# Patient Record
Sex: MALE | Race: WHITE | NOT HISPANIC OR LATINO | Employment: OTHER | URBAN - METROPOLITAN AREA
[De-identification: names, ages, dates, MRNs, and addresses within clinical notes are randomized per-mention and may not be internally consistent; named-entity substitution may affect disease eponyms.]

---

## 2017-12-12 ENCOUNTER — HOSPITAL ENCOUNTER (OUTPATIENT)
Facility: MEDICAL CENTER | Age: 72
End: 2017-12-12
Attending: NEUROLOGICAL SURGERY | Admitting: NEUROLOGICAL SURGERY

## 2018-03-27 ENCOUNTER — APPOINTMENT (OUTPATIENT)
Dept: ADMISSIONS | Facility: MEDICAL CENTER | Age: 73
DRG: 455 | End: 2018-03-27
Attending: NEUROLOGICAL SURGERY
Payer: COMMERCIAL

## 2018-03-27 RX ORDER — TIZANIDINE 2 MG/1
2 TABLET ORAL EVERY 8 HOURS PRN
COMMUNITY

## 2018-03-27 RX ORDER — ACETAMINOPHEN 500 MG
1000 TABLET ORAL EVERY 6 HOURS PRN
Status: ON HOLD | COMMUNITY
End: 2018-03-30

## 2018-03-27 RX ORDER — LATANOPROST 50 UG/ML
1 SOLUTION/ DROPS OPHTHALMIC NIGHTLY
COMMUNITY

## 2018-03-27 RX ORDER — DIPHENHYDRAMINE HCL 25 MG
25 TABLET ORAL EVERY 6 HOURS PRN
COMMUNITY

## 2018-03-27 RX ORDER — TAMSULOSIN HYDROCHLORIDE 0.4 MG/1
0.4 CAPSULE ORAL EVERY EVENING
COMMUNITY

## 2018-03-27 RX ORDER — AMOXICILLIN 500 MG/1
2000 CAPSULE ORAL PRN
Status: ON HOLD | COMMUNITY
End: 2018-03-28

## 2018-03-28 ENCOUNTER — HOSPITAL ENCOUNTER (INPATIENT)
Facility: MEDICAL CENTER | Age: 73
LOS: 2 days | DRG: 455 | End: 2018-03-30
Attending: NEUROLOGICAL SURGERY | Admitting: NEUROLOGICAL SURGERY
Payer: COMMERCIAL

## 2018-03-28 ENCOUNTER — APPOINTMENT (OUTPATIENT)
Dept: RADIOLOGY | Facility: MEDICAL CENTER | Age: 73
DRG: 455 | End: 2018-03-28
Attending: NEUROLOGICAL SURGERY
Payer: COMMERCIAL

## 2018-03-28 DIAGNOSIS — M54.50 ACUTE MIDLINE LOW BACK PAIN WITHOUT SCIATICA: ICD-10-CM

## 2018-03-28 DIAGNOSIS — G89.18 POSTOPERATIVE PAIN: ICD-10-CM

## 2018-03-28 PROCEDURE — 700111 HCHG RX REV CODE 636 W/ 250 OVERRIDE (IP): Performed by: NURSE PRACTITIONER

## 2018-03-28 PROCEDURE — 0SG00AJ FUSION OF LUMBAR VERTEBRAL JOINT WITH INTERBODY FUSION DEVICE, POSTERIOR APPROACH, ANTERIOR COLUMN, OPEN APPROACH: ICD-10-PCS | Performed by: NEUROLOGICAL SURGERY

## 2018-03-28 PROCEDURE — C1821 INTERSPINOUS IMPLANT: HCPCS | Performed by: NEUROLOGICAL SURGERY

## 2018-03-28 PROCEDURE — 95937 NEUROMUSCULAR JUNCTION TEST: CPT | Performed by: NEUROLOGICAL SURGERY

## 2018-03-28 PROCEDURE — A9270 NON-COVERED ITEM OR SERVICE: HCPCS | Performed by: NURSE PRACTITIONER

## 2018-03-28 PROCEDURE — 700101 HCHG RX REV CODE 250

## 2018-03-28 PROCEDURE — 500367 HCHG DRAIN KIT, HEMOVAC: Performed by: NEUROLOGICAL SURGERY

## 2018-03-28 PROCEDURE — 0SG0071 FUSION OF LUMBAR VERTEBRAL JOINT WITH AUTOLOGOUS TISSUE SUBSTITUTE, POSTERIOR APPROACH, POSTERIOR COLUMN, OPEN APPROACH: ICD-10-PCS | Performed by: NEUROLOGICAL SURGERY

## 2018-03-28 PROCEDURE — 110454 HCHG SHELL REV 250: Performed by: NEUROLOGICAL SURGERY

## 2018-03-28 PROCEDURE — C1713 ANCHOR/SCREW BN/BN,TIS/BN: HCPCS | Performed by: NEUROLOGICAL SURGERY

## 2018-03-28 PROCEDURE — 160002 HCHG RECOVERY MINUTES (STAT): Performed by: NEUROLOGICAL SURGERY

## 2018-03-28 PROCEDURE — 95861 NEEDLE EMG 2 EXTREMITIES: CPT | Performed by: NEUROLOGICAL SURGERY

## 2018-03-28 PROCEDURE — 160009 HCHG ANES TIME/MIN: Performed by: NEUROLOGICAL SURGERY

## 2018-03-28 PROCEDURE — 160031 HCHG SURGERY MINUTES - 1ST 30 MINS LEVEL 5: Performed by: NEUROLOGICAL SURGERY

## 2018-03-28 PROCEDURE — 01NB0ZZ RELEASE LUMBAR NERVE, OPEN APPROACH: ICD-10-PCS | Performed by: NEUROLOGICAL SURGERY

## 2018-03-28 PROCEDURE — 503195 HCHG SEALER, BIPOLAR AQUAMANTYS: Performed by: NEUROLOGICAL SURGERY

## 2018-03-28 PROCEDURE — 110371 HCHG SHELL REV 272: Performed by: NEUROLOGICAL SURGERY

## 2018-03-28 PROCEDURE — 700111 HCHG RX REV CODE 636 W/ 250 OVERRIDE (IP)

## 2018-03-28 PROCEDURE — 700101 HCHG RX REV CODE 250: Performed by: NURSE PRACTITIONER

## 2018-03-28 PROCEDURE — 501838 HCHG SUTURE GENERAL: Performed by: NEUROLOGICAL SURGERY

## 2018-03-28 PROCEDURE — 0SB20ZZ EXCISION OF LUMBAR VERTEBRAL DISC, OPEN APPROACH: ICD-10-PCS | Performed by: NEUROLOGICAL SURGERY

## 2018-03-28 PROCEDURE — 160048 HCHG OR STATISTICAL LEVEL 1-5: Performed by: NEUROLOGICAL SURGERY

## 2018-03-28 PROCEDURE — 160035 HCHG PACU - 1ST 60 MINS PHASE I: Performed by: NEUROLOGICAL SURGERY

## 2018-03-28 PROCEDURE — 160042 HCHG SURGERY MINUTES - EA ADDL 1 MIN LEVEL 5: Performed by: NEUROLOGICAL SURGERY

## 2018-03-28 PROCEDURE — 700102 HCHG RX REV CODE 250 W/ 637 OVERRIDE(OP): Performed by: NURSE PRACTITIONER

## 2018-03-28 PROCEDURE — 160036 HCHG PACU - EA ADDL 30 MINS PHASE I: Performed by: NEUROLOGICAL SURGERY

## 2018-03-28 PROCEDURE — 95940 IONM IN OPERATNG ROOM 15 MIN: CPT | Performed by: NEUROLOGICAL SURGERY

## 2018-03-28 PROCEDURE — 700112 HCHG RX REV CODE 229: Performed by: NURSE PRACTITIONER

## 2018-03-28 PROCEDURE — 72100 X-RAY EXAM L-S SPINE 2/3 VWS: CPT

## 2018-03-28 PROCEDURE — 500885 HCHG PACK, JACKSON TABLE: Performed by: NEUROLOGICAL SURGERY

## 2018-03-28 PROCEDURE — 502240 HCHG MISC OR SUPPLY RC 0272: Performed by: NEUROLOGICAL SURGERY

## 2018-03-28 PROCEDURE — L0631 LSO SAG R AN/POS PNL PRE CST: HCPCS

## 2018-03-28 PROCEDURE — 770001 HCHG ROOM/CARE - MED/SURG/GYN PRIV*

## 2018-03-28 PROCEDURE — A4314 CATH W/DRAINAGE 2-WAY LATEX: HCPCS | Performed by: NEUROLOGICAL SURGERY

## 2018-03-28 PROCEDURE — 95938 SOMATOSENSORY TESTING: CPT | Performed by: NEUROLOGICAL SURGERY

## 2018-03-28 DEVICE — ROD PREBENT TITANIUM 5.5 X 30MM (2TCONX2=4): Type: IMPLANTABLE DEVICE | Status: FUNCTIONAL

## 2018-03-28 DEVICE — SCREW SOLERA SET SCREW (1TCX40+3TCX21+2TCX10=123): Type: IMPLANTABLE DEVICE | Status: FUNCTIONAL

## 2018-03-28 DEVICE — SPACER RISE 10X26MM 8-15MM 10 DEGREES (3TCONX2=6): Type: IMPLANTABLE DEVICE | Status: FUNCTIONAL

## 2018-03-28 DEVICE — SCREW MAS  SOLERA 6.5 X 45MM (1TCX16+3TCX8=40): Type: IMPLANTABLE DEVICE | Status: FUNCTIONAL

## 2018-03-28 RX ORDER — ONDANSETRON 2 MG/ML
4 INJECTION INTRAMUSCULAR; INTRAVENOUS EVERY 4 HOURS PRN
Status: DISCONTINUED | OUTPATIENT
Start: 2018-03-28 | End: 2018-03-30 | Stop reason: HOSPADM

## 2018-03-28 RX ORDER — BUPIVACAINE HYDROCHLORIDE AND EPINEPHRINE 2.5; 5 MG/ML; UG/ML
INJECTION, SOLUTION EPIDURAL; INFILTRATION; INTRACAUDAL; PERINEURAL
Status: DISCONTINUED | OUTPATIENT
Start: 2018-03-28 | End: 2018-03-28 | Stop reason: HOSPADM

## 2018-03-28 RX ORDER — LIDOCAINE HYDROCHLORIDE 10 MG/ML
0.5 INJECTION, SOLUTION INFILTRATION; PERINEURAL
Status: ACTIVE | OUTPATIENT
Start: 2018-03-28 | End: 2018-03-29

## 2018-03-28 RX ORDER — LATANOPROST 50 UG/ML
1 SOLUTION/ DROPS OPHTHALMIC NIGHTLY
Status: DISCONTINUED | OUTPATIENT
Start: 2018-03-28 | End: 2018-03-30 | Stop reason: HOSPADM

## 2018-03-28 RX ORDER — POLYETHYLENE GLYCOL 3350 17 G/17G
1 POWDER, FOR SOLUTION ORAL 2 TIMES DAILY PRN
Status: DISCONTINUED | OUTPATIENT
Start: 2018-03-28 | End: 2018-03-30 | Stop reason: HOSPADM

## 2018-03-28 RX ORDER — SODIUM CHLORIDE AND POTASSIUM CHLORIDE 150; 900 MG/100ML; MG/100ML
INJECTION, SOLUTION INTRAVENOUS CONTINUOUS
Status: DISCONTINUED | OUTPATIENT
Start: 2018-03-28 | End: 2018-03-30 | Stop reason: HOSPADM

## 2018-03-28 RX ORDER — ENEMA 19; 7 G/133ML; G/133ML
1 ENEMA RECTAL
Status: DISCONTINUED | OUTPATIENT
Start: 2018-03-28 | End: 2018-03-30 | Stop reason: HOSPADM

## 2018-03-28 RX ORDER — SIMVASTATIN 20 MG
20 TABLET ORAL NIGHTLY
Status: DISCONTINUED | OUTPATIENT
Start: 2018-03-28 | End: 2018-03-30 | Stop reason: HOSPADM

## 2018-03-28 RX ORDER — ONDANSETRON 4 MG/1
4 TABLET, ORALLY DISINTEGRATING ORAL EVERY 4 HOURS PRN
Status: DISCONTINUED | OUTPATIENT
Start: 2018-03-28 | End: 2018-03-30 | Stop reason: HOSPADM

## 2018-03-28 RX ORDER — SODIUM CHLORIDE, SODIUM LACTATE, POTASSIUM CHLORIDE, CALCIUM CHLORIDE 600; 310; 30; 20 MG/100ML; MG/100ML; MG/100ML; MG/100ML
INJECTION, SOLUTION INTRAVENOUS CONTINUOUS
Status: DISCONTINUED | OUTPATIENT
Start: 2018-03-28 | End: 2018-03-28

## 2018-03-28 RX ORDER — CEFAZOLIN SODIUM 2 G/100ML
2 INJECTION, SOLUTION INTRAVENOUS EVERY 8 HOURS
Status: COMPLETED | OUTPATIENT
Start: 2018-03-28 | End: 2018-03-29

## 2018-03-28 RX ORDER — DOCUSATE SODIUM 100 MG/1
100 CAPSULE, LIQUID FILLED ORAL 2 TIMES DAILY
Status: DISCONTINUED | OUTPATIENT
Start: 2018-03-28 | End: 2018-03-30 | Stop reason: HOSPADM

## 2018-03-28 RX ORDER — TAMSULOSIN HYDROCHLORIDE 0.4 MG/1
0.4 CAPSULE ORAL EVERY EVENING
Status: DISCONTINUED | OUTPATIENT
Start: 2018-03-28 | End: 2018-03-30 | Stop reason: HOSPADM

## 2018-03-28 RX ORDER — LIDOCAINE HYDROCHLORIDE 10 MG/ML
INJECTION, SOLUTION INFILTRATION; PERINEURAL
Status: COMPLETED
Start: 2018-03-28 | End: 2018-03-28

## 2018-03-28 RX ORDER — BISACODYL 10 MG
10 SUPPOSITORY, RECTAL RECTAL
Status: DISCONTINUED | OUTPATIENT
Start: 2018-03-28 | End: 2018-03-30 | Stop reason: HOSPADM

## 2018-03-28 RX ORDER — AMOXICILLIN 250 MG
1 CAPSULE ORAL
Status: DISCONTINUED | OUTPATIENT
Start: 2018-03-28 | End: 2018-03-30 | Stop reason: HOSPADM

## 2018-03-28 RX ORDER — DIPHENHYDRAMINE HYDROCHLORIDE 50 MG/ML
25 INJECTION INTRAMUSCULAR; INTRAVENOUS EVERY 6 HOURS PRN
Status: DISCONTINUED | OUTPATIENT
Start: 2018-03-28 | End: 2018-03-30 | Stop reason: HOSPADM

## 2018-03-28 RX ORDER — TIZANIDINE 4 MG/1
2 TABLET ORAL 3 TIMES DAILY PRN
Status: DISCONTINUED | OUTPATIENT
Start: 2018-03-28 | End: 2018-03-30 | Stop reason: HOSPADM

## 2018-03-28 RX ORDER — DIPHENHYDRAMINE HCL 25 MG
25 TABLET ORAL EVERY 6 HOURS PRN
Status: DISCONTINUED | OUTPATIENT
Start: 2018-03-28 | End: 2018-03-30 | Stop reason: HOSPADM

## 2018-03-28 RX ORDER — LIDOCAINE AND PRILOCAINE 25; 25 MG/G; MG/G
1 CREAM TOPICAL
Status: ACTIVE | OUTPATIENT
Start: 2018-03-28 | End: 2018-03-29

## 2018-03-28 RX ADMIN — HYDROMORPHONE HYDROCHLORIDE 0.5 MG: 10 INJECTION, SOLUTION INTRAMUSCULAR; INTRAVENOUS; SUBCUTANEOUS at 16:17

## 2018-03-28 RX ADMIN — CEFAZOLIN SODIUM 2 G: 2 INJECTION, SOLUTION INTRAVENOUS at 17:54

## 2018-03-28 RX ADMIN — TAMSULOSIN HYDROCHLORIDE 0.4 MG: 0.4 CAPSULE ORAL at 22:44

## 2018-03-28 RX ADMIN — SIMVASTATIN 20 MG: 20 TABLET, FILM COATED ORAL at 22:45

## 2018-03-28 RX ADMIN — DOCUSATE SODIUM 100 MG: 100 CAPSULE ORAL at 22:44

## 2018-03-28 RX ADMIN — LIDOCAINE HYDROCHLORIDE 0.5 ML: 10 INJECTION, SOLUTION INFILTRATION; PERINEURAL at 09:15

## 2018-03-28 RX ADMIN — SODIUM CHLORIDE, SODIUM LACTATE, POTASSIUM CHLORIDE, CALCIUM CHLORIDE: 600; 310; 30; 20 INJECTION, SOLUTION INTRAVENOUS at 09:15

## 2018-03-28 RX ADMIN — HYDROMORPHONE HYDROCHLORIDE 0.5 MG: 10 INJECTION, SOLUTION INTRAMUSCULAR; INTRAVENOUS; SUBCUTANEOUS at 16:45

## 2018-03-28 RX ADMIN — POTASSIUM CHLORIDE AND SODIUM CHLORIDE: 900; 150 INJECTION, SOLUTION INTRAVENOUS at 17:53

## 2018-03-28 RX ADMIN — TIZANIDINE 2 MG: 4 TABLET ORAL at 22:45

## 2018-03-28 RX ADMIN — LATANOPROST 1 DROP: 50 SOLUTION OPHTHALMIC at 22:52

## 2018-03-28 RX ADMIN — HYDROMORPHONE HYDROCHLORIDE: 2 INJECTION INTRAMUSCULAR; INTRAVENOUS; SUBCUTANEOUS at 19:00

## 2018-03-28 ASSESSMENT — PAIN SCALES - GENERAL
PAINLEVEL_OUTOF10: 4
PAINLEVEL_OUTOF10: 4
PAINLEVEL_OUTOF10: 6
PAINLEVEL_OUTOF10: 4
PAINLEVEL_OUTOF10: 0
PAINLEVEL_OUTOF10: 4
PAINLEVEL_OUTOF10: 4
PAINLEVEL_OUTOF10: 6
PAINLEVEL_OUTOF10: 0

## 2018-03-28 NOTE — OP REPORT
DATE OF SERVICE:  03/28/2018    PREOPERATIVE DIAGNOSES:  L4-L5 spondylolisthesis with lumbar stenosis and   neurogenic claudication.    POSTOPERATIVE DIAGNOSES:  1.  Bilateral L4 pars defects with L4 spondylolysis.  2.  L4-L5 spondylolisthesis with lumbar stenosis and neurogenic claudication.    PROCEDURES PERFORMED:  1.  L4, excision of Bernal fragment and decompression of bilateral L4 nerve   roots.  2.  L4-L5 transforaminal lumbar interbody fusion with the use of Globus Rise   cage measuring 8 mm to 15 mm in height, with 10 degrees of lordosis measuring   26 mm in depth.  3.  Posterolateral arthrodesis with use of locally harvested bone graft.  4.  Instrumented stabilization with reduction and correction of   spondylolisthesis with use of Medtronic Solera system.    SURGEON:  Jabari Hernandez MD    ASSISTANT:  RAJAN Rene    ANESTHESIA:  General anesthetic.    ANESTHESIOLOGIST:  Evangelist Arellano MD    PREPARATION:  The patient had somatosensory evoked potentials, motor evoked   and EMGs placed.  He had a preoperative Cuevas catheter placed.    In addition, this gentleman had 2 g of Ancef given preoperatively.    Again, he was prepared for somatosensory evoked potentials and EMGs.    INDICATIONS:  The patient presents with longstanding low back pain, which has   failed several courses of physical therapy.  He has a spondylolisthesis at   L4-L5, with severe stenosis.  The indications and possible complications of   the above procedure were explained to the patient.  At the time of surgery, we   found bilateral pars defects, which led us to completely remove the Bernal   fragment, decompress the nerve roots and do an instrumented stabilization at   the 4-5 level.    DESCRIPTION OF PROCEDURE:  The patient was brought to the operating room and   following induction, he was intubated and placed under general anesthetic.    At this time, he was rolled prone onto the operating room table using the   chest, hip,  thigh pads.  In a prone position, all pressure points were padded,   sequential stockings were started and he was prepared for somatosensory   evoked potentials and EMGs.    We localized the L4-L5 level with fluoroscopy.  The back was prepped and   draped in a sterile fashion.  A time-out was performed.  An incision was made   over L3-L4, L4-L5 and carried down through the subcutaneous tissue after   injection with 40 mL of Marcaine 0.25% with epinephrine.  The fascia was   subsequently injected with another 20 mL of Marcaine 0.5% with epinephrine.    We detached the interconnecting ligaments, and did a subperiosteal dissection   at L3, L4, and L5.  We dissected out over the transverse processes of 4 and 5,   decorticated this area after intraoperative x-ray confirmed the location.    With retractors and maintained exposure, we removed the spinous processes of 4   and 5.  We found that the 4 lamina was very loose, and then at this time, we   drilled the junction of the lamina and facet at 4 to a very thin shelf and   removed the center portion of the bone.  It became obvious that the patient   had a bilateral pars defect.  We therefore removed the pars, as well as the   inferior facet of L4 bilaterally.  Nerve roots were completely decompressed at   this time, but we did identify the spondylolisthesis of 4 and 5.  Locally   harvested bone graft was ground up and packed into the posterolateral   recesses.    We were then able on the left side to identify the pedicle of L4 and L5 and   drilled down their axis with the Midas Bro followed by the Koko[employer]   navigation system and a 5.5 tap.  We secured a 6.5x45 mm screw into L5 on the   left.  This screw was dorsal to a subsequent screw that was drilled and placed   at L4 on the left.  We secured the 35 mm kaylyn into the multiaxial head and   reduced the L4 multiaxial head into the kaylyn reducing the spondylolisthesis.    We then distracted about 8 mm, reducing again the  compressive effects and   decreased foramina on this side.    Intraoperative x-ray confirmed good location of our reduction, drying the   thecal sac medially we were able to coagulate the epidural vessels, opened the   disk space with an 11 bladed scalpel and identified the disk space with a   straight curet.  We then were able to use sequential paddle darryl starting   with a #5 and moving to a #9.  We did not get into the bony endplates, but we   were able to remove the cartilaginous endplates and using up and down biting   curettes, removed degenerative disk material anteriorly.  Locally harvested   bone graft was then cut, to fit anteriorly measuring 1 cm in length, 8 mm in   diameter and 8 mm in height, tamped anteriorly, this served as an anterior   bony construct followed by the Globus Rise cage, which was tamped into   position and opened to approximately 14 mm.    We had good reduction, good placement of the cage and instrumentation.  We   went to the contralateral side, drilled down the axis of both pedicles after   decompressing around them with the Midas Bro followed by the Flock[employer]   navigation system and a 5.5 tap with placement of a 6.5x45 mm screws into L4   and L5.  A 35 mm kaylyn was placed across the multiaxial heads and secured.    All set screws were broken off at appropriate torque.  Intraoperative x-ray   confirmed good location of instrumentation in the AP and lateral position.    A medium size Hemovac was placed after meticulous hemostasis was obtained.    One gram of vancomycin powder was sprinkled into the wound and spread about.    We closed the fascia with 1 Vicryl sutures, subcutaneous tissue with 2-0   Vicryl, subcuticular with 3-0 Vicryl, skin with Steri-Strips.  All sponge and   needle counts were correct.  Estimated blood loss was less than 150 mL.       ____________________________________     MD DEBI DAMON / MAN    DD:  03/28/2018 14:42:11  DT:  03/28/2018  15:10:12    D#:  [id number]  Job#:  [id number]

## 2018-03-28 NOTE — OR SURGEON
Immediate Post OP Note    PreOp Diagnosis: L45 spondylolisthesis with lumbar stenosis and neurogenic claudication    PostOp Diagnosis: 1. L4 bilateral pars fractures - spondylolysis 2. L45 spondylolisthesis    Procedure(s):  LUMBAR FUSION POSTERIOR- TLIF L4-5, REMOVAL OF OLIVER FRAGMENT L4 - Wound Class: Clean with Drain  LUMBAR DECOMPRESSION - Wound Class: Clean with Drain    Surgeon(s):  Jabari Hernandez M.D.    Anesthesiologist/Type of Anesthesia:  Anesthesiologist: Evangelist Arellano M.D./General    Surgical Staff:  Assistant: ROSALIO Obrien  Circulator: Yann Dangelo R.N.; Regina Jimenes R.N.  Scrub Person: Aishwarya Lucero  Radiology Technologist: Shruti Davila; Regina Wolfe; Jenni STARKEY Gross    Specimens removed if any:  * No specimens in log *    Estimated Blood Loss: 150 cc    Findings: as above    Complications: none        3/28/2018 2:31 PM Jabari Hernandez M.D.

## 2018-03-29 LAB
ANION GAP SERPL CALC-SCNC: 8 MMOL/L (ref 0–11.9)
BUN SERPL-MCNC: 13 MG/DL (ref 8–22)
CALCIUM SERPL-MCNC: 8.2 MG/DL (ref 8.5–10.5)
CHLORIDE SERPL-SCNC: 102 MMOL/L (ref 96–112)
CO2 SERPL-SCNC: 24 MMOL/L (ref 20–33)
CREAT SERPL-MCNC: 0.92 MG/DL (ref 0.5–1.4)
ERYTHROCYTE [DISTWIDTH] IN BLOOD BY AUTOMATED COUNT: 40.3 FL (ref 35.9–50)
GLUCOSE SERPL-MCNC: 115 MG/DL (ref 65–99)
HCT VFR BLD AUTO: 37 % (ref 42–52)
HGB BLD-MCNC: 12.7 G/DL (ref 14–18)
MCH RBC QN AUTO: 30 PG (ref 27–33)
MCHC RBC AUTO-ENTMCNC: 34.3 G/DL (ref 33.7–35.3)
MCV RBC AUTO: 87.5 FL (ref 81.4–97.8)
PLATELET # BLD AUTO: 178 K/UL (ref 164–446)
PMV BLD AUTO: 9.5 FL (ref 9–12.9)
POTASSIUM SERPL-SCNC: 3.9 MMOL/L (ref 3.6–5.5)
RBC # BLD AUTO: 4.23 M/UL (ref 4.7–6.1)
SODIUM SERPL-SCNC: 134 MMOL/L (ref 135–145)
WBC # BLD AUTO: 13 K/UL (ref 4.8–10.8)

## 2018-03-29 PROCEDURE — 700111 HCHG RX REV CODE 636 W/ 250 OVERRIDE (IP): Performed by: NURSE PRACTITIONER

## 2018-03-29 PROCEDURE — 700101 HCHG RX REV CODE 250: Performed by: NURSE PRACTITIONER

## 2018-03-29 PROCEDURE — G8979 MOBILITY GOAL STATUS: HCPCS | Mod: CI

## 2018-03-29 PROCEDURE — 700102 HCHG RX REV CODE 250 W/ 637 OVERRIDE(OP): Performed by: CLINICAL NURSE SPECIALIST

## 2018-03-29 PROCEDURE — 97165 OT EVAL LOW COMPLEX 30 MIN: CPT

## 2018-03-29 PROCEDURE — 80048 BASIC METABOLIC PNL TOTAL CA: CPT

## 2018-03-29 PROCEDURE — 36415 COLL VENOUS BLD VENIPUNCTURE: CPT

## 2018-03-29 PROCEDURE — G8978 MOBILITY CURRENT STATUS: HCPCS | Mod: CK

## 2018-03-29 PROCEDURE — A9270 NON-COVERED ITEM OR SERVICE: HCPCS | Performed by: NURSE PRACTITIONER

## 2018-03-29 PROCEDURE — 97162 PT EVAL MOD COMPLEX 30 MIN: CPT

## 2018-03-29 PROCEDURE — 770001 HCHG ROOM/CARE - MED/SURG/GYN PRIV*

## 2018-03-29 PROCEDURE — 700102 HCHG RX REV CODE 250 W/ 637 OVERRIDE(OP): Performed by: NURSE PRACTITIONER

## 2018-03-29 PROCEDURE — G8988 SELF CARE GOAL STATUS: HCPCS | Mod: CI

## 2018-03-29 PROCEDURE — 85027 COMPLETE CBC AUTOMATED: CPT

## 2018-03-29 PROCEDURE — 700112 HCHG RX REV CODE 229: Performed by: NURSE PRACTITIONER

## 2018-03-29 PROCEDURE — A9270 NON-COVERED ITEM OR SERVICE: HCPCS | Performed by: CLINICAL NURSE SPECIALIST

## 2018-03-29 PROCEDURE — G8987 SELF CARE CURRENT STATUS: HCPCS | Mod: CK

## 2018-03-29 RX ORDER — ACETAMINOPHEN 325 MG/1
650 TABLET ORAL EVERY 6 HOURS PRN
Status: DISCONTINUED | OUTPATIENT
Start: 2018-03-29 | End: 2018-03-30 | Stop reason: HOSPADM

## 2018-03-29 RX ORDER — OXYCODONE AND ACETAMINOPHEN 10; 325 MG/1; MG/1
1-2 TABLET ORAL EVERY 4 HOURS PRN
Status: DISCONTINUED | OUTPATIENT
Start: 2018-03-29 | End: 2018-03-30 | Stop reason: HOSPADM

## 2018-03-29 RX ORDER — DEXAMETHASONE SODIUM PHOSPHATE 4 MG/ML
4 INJECTION, SOLUTION INTRA-ARTICULAR; INTRALESIONAL; INTRAMUSCULAR; INTRAVENOUS; SOFT TISSUE EVERY 6 HOURS
Status: DISCONTINUED | OUTPATIENT
Start: 2018-03-29 | End: 2018-03-30 | Stop reason: HOSPADM

## 2018-03-29 RX ADMIN — OXYCODONE HYDROCHLORIDE AND ACETAMINOPHEN 1 TABLET: 10; 325 TABLET ORAL at 22:52

## 2018-03-29 RX ADMIN — SIMVASTATIN 20 MG: 20 TABLET, FILM COATED ORAL at 21:01

## 2018-03-29 RX ADMIN — ACETAMINOPHEN 650 MG: 325 TABLET, FILM COATED ORAL at 03:24

## 2018-03-29 RX ADMIN — OXYCODONE HYDROCHLORIDE AND ACETAMINOPHEN 1 TABLET: 10; 325 TABLET ORAL at 13:20

## 2018-03-29 RX ADMIN — DOCUSATE SODIUM 100 MG: 100 CAPSULE ORAL at 09:14

## 2018-03-29 RX ADMIN — DEXAMETHASONE SODIUM PHOSPHATE 4 MG: 4 INJECTION, SOLUTION INTRAMUSCULAR; INTRAVENOUS at 15:53

## 2018-03-29 RX ADMIN — OXYCODONE HYDROCHLORIDE AND ACETAMINOPHEN 1 TABLET: 10; 325 TABLET ORAL at 21:01

## 2018-03-29 RX ADMIN — LATANOPROST 1 DROP: 50 SOLUTION OPHTHALMIC at 21:04

## 2018-03-29 RX ADMIN — POTASSIUM CHLORIDE AND SODIUM CHLORIDE: 900; 150 INJECTION, SOLUTION INTRAVENOUS at 04:15

## 2018-03-29 RX ADMIN — DIPHENHYDRAMINE HCL 25 MG: 25 TABLET ORAL at 23:08

## 2018-03-29 RX ADMIN — TIZANIDINE 2 MG: 4 TABLET ORAL at 14:34

## 2018-03-29 RX ADMIN — OXYCODONE HYDROCHLORIDE AND ACETAMINOPHEN 1 TABLET: 10; 325 TABLET ORAL at 09:14

## 2018-03-29 RX ADMIN — CEFAZOLIN SODIUM 2 G: 2 INJECTION, SOLUTION INTRAVENOUS at 02:16

## 2018-03-29 RX ADMIN — TAMSULOSIN HYDROCHLORIDE 0.4 MG: 0.4 CAPSULE ORAL at 21:00

## 2018-03-29 RX ADMIN — DEXAMETHASONE SODIUM PHOSPHATE 4 MG: 4 INJECTION, SOLUTION INTRAMUSCULAR; INTRAVENOUS at 09:14

## 2018-03-29 RX ADMIN — DEXAMETHASONE SODIUM PHOSPHATE 4 MG: 4 INJECTION, SOLUTION INTRAMUSCULAR; INTRAVENOUS at 22:52

## 2018-03-29 ASSESSMENT — LIFESTYLE VARIABLES
HAVE PEOPLE ANNOYED YOU BY CRITICIZING YOUR DRINKING: NO
TOTAL SCORE: 0
DO YOU DRINK ALCOHOL: YES
ON A TYPICAL DAY WHEN YOU DRINK ALCOHOL HOW MANY DRINKS DO YOU HAVE: 3
CONSUMPTION TOTAL: INCOMPLETE
EVER_SMOKED: NEVER
TOTAL SCORE: 0
EVER FELT BAD OR GUILTY ABOUT YOUR DRINKING: NO
EVER HAD A DRINK FIRST THING IN THE MORNING TO STEADY YOUR NERVES TO GET RID OF A HANGOVER: NO
TOTAL SCORE: 0
HAVE YOU EVER FELT YOU SHOULD CUT DOWN ON YOUR DRINKING: NO

## 2018-03-29 ASSESSMENT — PAIN SCALES - GENERAL
PAINLEVEL_OUTOF10: 6
PAINLEVEL_OUTOF10: 4
PAINLEVEL_OUTOF10: 4
PAINLEVEL_OUTOF10: 7
PAINLEVEL_OUTOF10: 4
PAINLEVEL_OUTOF10: 4
PAINLEVEL_OUTOF10: 6
PAINLEVEL_OUTOF10: 5
PAINLEVEL_OUTOF10: 8
PAINLEVEL_OUTOF10: 5

## 2018-03-29 ASSESSMENT — PATIENT HEALTH QUESTIONNAIRE - PHQ9
5. POOR APPETITE OR OVEREATING: NOT AT ALL
3. TROUBLE FALLING OR STAYING ASLEEP OR SLEEPING TOO MUCH: SEVERAL DAYS
7. TROUBLE CONCENTRATING ON THINGS, SUCH AS READING THE NEWSPAPER OR WATCHING TELEVISION: SEVERAL DAYS
1. LITTLE INTEREST OR PLEASURE IN DOING THINGS: NOT AT ALL
6. FEELING BAD ABOUT YOURSELF - OR THAT YOU ARE A FAILURE OR HAVE LET YOURSELF OR YOUR FAMILY DOWN: NOT AL ALL
9. THOUGHTS THAT YOU WOULD BE BETTER OFF DEAD, OR OF HURTING YOURSELF: NOT AT ALL
8. MOVING OR SPEAKING SO SLOWLY THAT OTHER PEOPLE COULD HAVE NOTICED. OR THE OPPOSITE, BEING SO FIGETY OR RESTLESS THAT YOU HAVE BEEN MOVING AROUND A LOT MORE THAN USUAL: NOT AT ALL
2. FEELING DOWN, DEPRESSED, IRRITABLE, OR HOPELESS: SEVERAL DAYS
SUM OF ALL RESPONSES TO PHQ9 QUESTIONS 1 AND 2: 1
SUM OF ALL RESPONSES TO PHQ QUESTIONS 1-9: 4
4. FEELING TIRED OR HAVING LITTLE ENERGY: SEVERAL DAYS

## 2018-03-29 ASSESSMENT — COGNITIVE AND FUNCTIONAL STATUS - GENERAL
TOILETING: A LITTLE
DRESSING REGULAR UPPER BODY CLOTHING: A LITTLE
MOVING TO AND FROM BED TO CHAIR: A LITTLE
STANDING UP FROM CHAIR USING ARMS: A LITTLE
MOBILITY SCORE: 17
SUGGESTED CMS G CODE MODIFIER MOBILITY: CK
TURNING FROM BACK TO SIDE WHILE IN FLAT BAD: A LITTLE
WALKING IN HOSPITAL ROOM: A LITTLE
PERSONAL GROOMING: A LITTLE
MOVING FROM LYING ON BACK TO SITTING ON SIDE OF FLAT BED: A LITTLE
CLIMB 3 TO 5 STEPS WITH RAILING: A LOT
HELP NEEDED FOR BATHING: A LOT
SUGGESTED CMS G CODE MODIFIER DAILY ACTIVITY: CK
DAILY ACTIVITIY SCORE: 17
DRESSING REGULAR LOWER BODY CLOTHING: A LOT

## 2018-03-29 ASSESSMENT — ACTIVITIES OF DAILY LIVING (ADL): TOILETING: INDEPENDENT

## 2018-03-29 ASSESSMENT — COPD QUESTIONNAIRES
DO YOU EVER COUGH UP ANY MUCUS OR PHLEGM?: NO/ONLY WITH OCCASIONAL COLDS OR INFECTIONS
DURING THE PAST 4 WEEKS HOW MUCH DID YOU FEEL SHORT OF BREATH: NONE/LITTLE OF THE TIME
COPD SCREENING SCORE: 2
HAVE YOU SMOKED AT LEAST 100 CIGARETTES IN YOUR ENTIRE LIFE: NO/DON'T KNOW

## 2018-03-29 ASSESSMENT — GAIT ASSESSMENTS
DEVIATION: ANTALGIC;STEP TO;DECREASED BASE OF SUPPORT;DECREASED HEEL STRIKE;DECREASED TOE OFF
ASSISTIVE DEVICE: FRONT WHEEL WALKER
DISTANCE (FEET): 50
GAIT LEVEL OF ASSIST: CONTACT GUARD ASSIST

## 2018-03-29 NOTE — PROGRESS NOTES
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 radiating pain, +pca, sheriff    Exam:  AAO, cooperative, df/pf- 5/5, incision with drsg- c/d, hvac- 70ml    BP  Min: 109/49  Max: 161/80  Pulse  Av  Min: 41  Max: 66  Resp  Av.9  Min: 12  Max: 20  Temp  Av.6 °C (99.6 °F)  Min: 36.6 °C (97.8 °F)  Max: 38.7 °C (101.6 °F)  SpO2  Av %  Min: 91 %  Max: 100 %    No Data Recorded    Recent Labs      18   0400   WBC  13.0*   RBC  4.23*   HEMOGLOBIN  12.7*   HEMATOCRIT  37.0*   MCV  87.5   MCH  30.0   MCHC  34.3   RDW  40.3   PLATELETCT  178   MPV  9.5     Recent Labs      18   0400   SODIUM  134*   POTASSIUM  3.9   CHLORIDE  102   CO2  24   GLUCOSE  115*   BUN  13   CREATININE  0.92   CALCIUM  8.2*               Intake/Output       18 0700 - 18 0659 18 0700 - 18 0659      4035-4368 4554-4126 Total 0666-9956 2477-2311 Total       Intake    P.O.  20  -- 20  --  -- --    P.O. 20 -- 20 -- -- --    I.V.  1300  24.9 1324.9  --  -- --    Crystalloid Intake 1300 -- 1300 -- -- --    PCA End of Shift Total Volume (ml) -- 24.9 24.9 -- -- --    Total Intake 1320 24.9 1344.9 -- -- --       Output    Urine  800  1200 2000  1200  -- 1200    Indwelling Cathether 800 1200 2000 1200 -- 1200    Drains  20  170 190  --  -- --    Hemovac 1 20 170 190 -- -- --    Blood  150  -- 150  --  -- --    Est. Blood Loss (mL) 150 -- 150 -- -- --    Total Output 970 1370 2340 1200 -- 1200       Net I/O     350 -1345.1 -995.1 -1200 -- -1200            Intake/Output Summary (Last 24 hours) at 18 0822  Last data filed at 18 0700   Gross per 24 hour   Intake           1344.9 ml   Output             3540 ml   Net          -2195.1 ml            • acetaminophen  650 mg Q6HRS PRN   • dexamethasone  4 mg Q6HRS   • oxyCODONE-acetaminophen  1-2 Tab Q4HRS PRN   • lidocaine-prilocaine  1 Application Once PRN     Or   • lidocaine  0.5 mL Once PRN   • latanoprost  1 Drop Nightly   • simvastatin  20 mg Nightly   • tamsulosin  0.4 mg Q EVENING   • Pharmacy Consult Request  1 Each PRN   • MD ALERT...Do not administer NSAIDS or ASPIRIN unless ORDERED By Neurosurgery  1 Each PRN   • docusate sodium  100 mg BID   • senna-docusate  1 Tab Q24HRS PRN   • polyethylene glycol/lytes  1 Packet BID PRN   • magnesium hydroxide  30 mL QDAY PRN   • bisacodyl  10 mg Q24HRS PRN   • fleet  1 Each Once PRN   • 0.9 % NaCl with KCl 20 mEq 1,000 mL   Continuous   • diphenhydrAMINE  25 mg Q6HRS PRN    Or   • diphenhydrAMINE  25 mg Q6HRS PRN   • ondansetron  4 mg Q4HRS PRN   • ondansetron  4 mg Q4HRS PRN   • tizanidine  2 mg TID PRN   • benzocaine-menthol  1 Lozenge Q2HRS PRN       Assessment and Plan:  POD #1 s/p L4 excision of Bernal fragment, L4-5 TLIF  Prophylactic anticoagulation: no         Start date/time: n/a  NM as above  Monitor drain  Dc pca- begin orals  Added decadron  PT/OT- brace on when oob >5 min

## 2018-03-29 NOTE — DISCHARGE PLANNING
Medical Social Work    SW received voicemail from Bettye ALVARENGA CM (Ph:[phone]) with [facility] stating that they can assist with pt's d/c plan. SW left voicemail for Unit SW since pt is not on this SW unit.

## 2018-03-29 NOTE — PROGRESS NOTES
At 10:20, pt sitting in chair 30+ mins, at 93% ox sat RA. Changed linen, assisted pt to bed, replaced and reapplied NC running at 2L.Repositoned in bed, left side, 4 pillows.

## 2018-03-29 NOTE — PROGRESS NOTES
"Report received from Day RN and assumed care at 1915. Patient is A&O x4, resting in bed. q4 neuro checks. SCDs in place.     2L oxygen, no SOB noted.  in place.     Incision to posterior trunk, dressing in place; clean, dry and intact. hemovac in place, monitoring output.     Patient reports  6/10 back pain, Dilaudid PCA in place. Patient stated \" I am not using the pain pump because it make my heart rate go up\". HR 59-70. Patient educated on medication in relation to HR. Demonstrates understanding.     Right PIV intact and flushes, NS w/ 20 K+ running @ 100 mL/hr. Cuevas in place. Monitoring output.     POC discussed. All needs met at this time. Call light within reach. Bed locked, in lowest position. Hourly rounding in place.     "

## 2018-03-29 NOTE — THERAPY
"Physical Therapy Evaluation completed.   Bed Mobility:  Supine to Sit: Contact Guard Assist  Transfers: Sit to Stand: Minimal Assist  Gait: Level Of Assist: Contact Guard Assist with Front-Wheel Walker       Plan of Care: Will benefit from Physical Therapy 5 times per week  Discharge Recommendations: Equipment: Front-Wheel Walker.     See \"Rehab Therapy-Acute\" Patient Summary Report for complete documentation.     RN notified of PT visit, cleared for PT evaluation. Pt. presented to PT s/p L4-L5 fusion/decompression. Pt. presented with imparied balance, imparied gait, pain, and decreased activity tolerance. Pt. was able to demonstrate CGA to Min A with all functional mobility at this time w/FWW. Pt. is primarily limited by pain at this time and relies heavily on the FWW for ambulation for short distances at this time. Anticipate pt to improve in activity tolerance with increased ambulation and recommend stair training prior to d/c home. Encouranged nsg staff to continue ambulation throughout day. Anticipate pt. to require a few more nights stay in hosptial prior to d/c home will recommend HH therapy services at this time.   "

## 2018-03-29 NOTE — PROGRESS NOTES
MD paged regarding patient's temperature of 101.6F.     Orders obtained by Elliott Noble for PRN Tylenol 650mg.

## 2018-03-29 NOTE — THERAPY
"Occupational Therapy Evaluation completed.   Functional Status:  Pt is a 71 y/o male admitted for elective lumbar spine surgery. He is currently requiring CGA for functional mobility with FWW. MaxA for LB cares. Supv grooming in stance at the sink. [facility]ch to [facility]. Pt edu on spine precautions during ADLs, will continue to educate. Pt limited by weakness, fatigue, pain, and impaired balance which impacts independence in ADLs and functional mobility.  Plan of Care: Will benefit from Occupational Therapy 5 times per week  Discharge Recommendations:  Equipment: Front-Wheel Walker and Shower Chair. Post-acute therapy likely Discharge to home with outpatient or home health for additional skilled therapy services.    See \"Rehab Therapy-Acute\" Patient Summary Report for complete documentation.    "

## 2018-03-29 NOTE — PROGRESS NOTES
Size large Deroyal off the shelf lumbar corset back support brace has been delivered to pt's bedside to use when up out of bed.

## 2018-03-29 NOTE — CARE PLAN
"Problem: Safety  Goal: Will remain free from falls  Bed locked in lowest position. Call light within reach.   Hourly rounding in place.     Problem: Pain Management  Goal: Pain level will decrease to patient's comfort goal  Patient reports  6/10 back pain, Dilaudid PCA in place. Patient stated \" I am not using the pain pump because it make my heart rate go up\". HR 59-70, per . Patient educated on medication in relation to HR. Demonstrates understanding. Will continue to assess.       "

## 2018-03-30 VITALS
HEIGHT: 72 IN | WEIGHT: 205.03 LBS | HEART RATE: 57 BPM | TEMPERATURE: 97.2 F | DIASTOLIC BLOOD PRESSURE: 51 MMHG | OXYGEN SATURATION: 93 % | SYSTOLIC BLOOD PRESSURE: 114 MMHG | BODY MASS INDEX: 27.77 KG/M2 | RESPIRATION RATE: 17 BRPM

## 2018-03-30 LAB
ANION GAP SERPL CALC-SCNC: 5 MMOL/L (ref 0–11.9)
BUN SERPL-MCNC: 16 MG/DL (ref 8–22)
CALCIUM SERPL-MCNC: 9 MG/DL (ref 8.5–10.5)
CHLORIDE SERPL-SCNC: 104 MMOL/L (ref 96–112)
CO2 SERPL-SCNC: 26 MMOL/L (ref 20–33)
CREAT SERPL-MCNC: 0.84 MG/DL (ref 0.5–1.4)
ERYTHROCYTE [DISTWIDTH] IN BLOOD BY AUTOMATED COUNT: 40.3 FL (ref 35.9–50)
GLUCOSE SERPL-MCNC: 160 MG/DL (ref 65–99)
HCT VFR BLD AUTO: 37 % (ref 42–52)
HGB BLD-MCNC: 12.7 G/DL (ref 14–18)
MCH RBC QN AUTO: 29.9 PG (ref 27–33)
MCHC RBC AUTO-ENTMCNC: 34.3 G/DL (ref 33.7–35.3)
MCV RBC AUTO: 87.1 FL (ref 81.4–97.8)
PLATELET # BLD AUTO: 198 K/UL (ref 164–446)
PMV BLD AUTO: 9.8 FL (ref 9–12.9)
POTASSIUM SERPL-SCNC: 4.2 MMOL/L (ref 3.6–5.5)
RBC # BLD AUTO: 4.25 M/UL (ref 4.7–6.1)
SODIUM SERPL-SCNC: 135 MMOL/L (ref 135–145)
WBC # BLD AUTO: 15.2 K/UL (ref 4.8–10.8)

## 2018-03-30 PROCEDURE — A9270 NON-COVERED ITEM OR SERVICE: HCPCS | Performed by: NURSE PRACTITIONER

## 2018-03-30 PROCEDURE — 97535 SELF CARE MNGMENT TRAINING: CPT

## 2018-03-30 PROCEDURE — 85027 COMPLETE CBC AUTOMATED: CPT

## 2018-03-30 PROCEDURE — 700102 HCHG RX REV CODE 250 W/ 637 OVERRIDE(OP): Performed by: NURSE PRACTITIONER

## 2018-03-30 PROCEDURE — 97116 GAIT TRAINING THERAPY: CPT

## 2018-03-30 PROCEDURE — 700111 HCHG RX REV CODE 636 W/ 250 OVERRIDE (IP): Performed by: NURSE PRACTITIONER

## 2018-03-30 PROCEDURE — G8980 MOBILITY D/C STATUS: HCPCS | Mod: CI

## 2018-03-30 PROCEDURE — 80048 BASIC METABOLIC PNL TOTAL CA: CPT

## 2018-03-30 PROCEDURE — 36415 COLL VENOUS BLD VENIPUNCTURE: CPT

## 2018-03-30 PROCEDURE — G8979 MOBILITY GOAL STATUS: HCPCS | Mod: CI

## 2018-03-30 PROCEDURE — 700112 HCHG RX REV CODE 229: Performed by: NURSE PRACTITIONER

## 2018-03-30 RX ORDER — OXYCODONE AND ACETAMINOPHEN 10; 325 MG/1; MG/1
1-2 TABLET ORAL EVERY 4 HOURS PRN
Qty: 56 TAB | Refills: 0 | Status: SHIPPED | OUTPATIENT
Start: 2018-03-30 | End: 2018-04-06

## 2018-03-30 RX ADMIN — OXYCODONE HYDROCHLORIDE AND ACETAMINOPHEN 1 TABLET: 10; 325 TABLET ORAL at 13:36

## 2018-03-30 RX ADMIN — DEXAMETHASONE SODIUM PHOSPHATE 4 MG: 4 INJECTION, SOLUTION INTRAMUSCULAR; INTRAVENOUS at 05:49

## 2018-03-30 RX ADMIN — POLYETHYLENE GLYCOL 3350 1 PACKET: 17 POWDER, FOR SOLUTION ORAL at 11:53

## 2018-03-30 RX ADMIN — TIZANIDINE 2 MG: 4 TABLET ORAL at 08:52

## 2018-03-30 RX ADMIN — DOCUSATE SODIUM 100 MG: 100 CAPSULE ORAL at 11:53

## 2018-03-30 RX ADMIN — OXYCODONE HYDROCHLORIDE AND ACETAMINOPHEN 1 TABLET: 10; 325 TABLET ORAL at 05:54

## 2018-03-30 ASSESSMENT — GAIT ASSESSMENTS
DEVIATION: ANTALGIC;DECREASED BASE OF SUPPORT;DECREASED HEEL STRIKE;DECREASED TOE OFF
ASSISTIVE DEVICE: FRONT WHEEL WALKER
GAIT LEVEL OF ASSIST: SUPERVISED
DISTANCE (FEET): 500

## 2018-03-30 ASSESSMENT — COGNITIVE AND FUNCTIONAL STATUS - GENERAL
CLIMB 3 TO 5 STEPS WITH RAILING: A LITTLE
SUGGESTED CMS G CODE MODIFIER MOBILITY: CI
MOBILITY SCORE: 23

## 2018-03-30 ASSESSMENT — PAIN SCALES - GENERAL
PAINLEVEL_OUTOF10: 2
PAINLEVEL_OUTOF10: 2
PAINLEVEL_OUTOF10: 4

## 2018-03-30 NOTE — DISCHARGE PLANNING
Medical Social Work    Referral:  DME    Intervention:  SW attempted to obtain CHOICE for FWW from pt bedside.  Pt is working with therapies. SW will attempt at a later time.    Plan:  SW will remain available for dc planning      ADD:  SW faxed CHOICE to [facility]. due to pt's insurance only one company to use.

## 2018-03-30 NOTE — THERAPY
"Pt w/improved balance, gait, and activity tolerance. Pt demonstrated smooth, steady gait, w/decresed heel-toe off, requirng verbal cuing. Pt w/improved upright posture, no verbal cuing required. Pt has met all goals and should be able to return home w/family support and HH or outpt services.     Physical Therapy Treatment completed.   Bed Mobility:  Supine to Sit: Supervised  Transfers: Sit to Stand: Supervised  Gait: Level Of Assist: Supervised with Front-Wheel Walker       Plan of Care: Will benefit from Physical Therapy 5 times per week    See \"Rehab Therapy-Acute\" Patient Summary Report for complete documentation.       "

## 2018-03-30 NOTE — DISCHARGE INSTRUCTIONS
Discharge Instructions    Discharged to home by car with relative. Discharged via wheelchair, hospital escort: Yes.  Special equipment needed: Walker    Be sure to schedule a follow-up appointment with your primary care doctor or any specialists as instructed.     Discharge Plan:   Diet Plan: Discussed  Activity Level: Discussed  Confirmed Follow up Appointment: Patient to Call and Schedule Appointment  Confirmed Symptoms Management: Discussed  Influenza Vaccine Indication: Patient Refuses    I understand that a diet low in cholesterol, fat, and sodium is recommended for good health. Unless I have been given specific instructions below for another diet, I accept this instruction as my diet prescription.   Other diet: Regular    Special Instructions: None    · Is patient discharged on Warfarin / Coumadin?   No       Wear brace when out of bed   Ok to shower 3/31, pat incision dry, leave open to air- no dressing   May remove brace to shower   No Aspirin or non-steroidal anti-inflammatory medications (aleve, motrin, ibuprofen, celebrex)   No driving for 2 weeks/ No driving while on narcotic medication   Over the counter stool softeners daily while on narcotics   No lifting greater than 15 pounds, no repetitive bending or twisting   Follow up at [facility] 2 weeks after surgery      Spinal Fusion, Care After  Refer to this sheet in the next few weeks. These instructions provide you with information about caring for yourself after your procedure. Your health care provider may also give you more specific instructions. Your treatment has been planned according to current medical practices, but problems sometimes occur. Call your health care provider if you have any problems or questions after your procedure.  WHAT TO EXPECT AFTER THE PROCEDURE  After your procedure, it is common to have:  · Pain and stiffness in your back.  · Pain around your incision.  HOME CARE INSTRUCTIONS   Medicines   · Take over-the-counter  and prescription medicines only as told by your health care provider. These include any pain medicines.  · Do not drive for 24 hours if you received a sedative.  · Do not drive or operate heavy machinery while taking prescription pain medicine.  · If you were prescribed an antibiotic medicine, take it as told by your health care provider. Do not stop taking the antibiotic even if you start to feel better.  Incision Care   · Follow instructions from your health care provider about how to take care of your incision. Make sure you:  ¨ Wash your hands with soap and water before you change your bandage (dressing). If soap and water are not available, use hand .  ¨ Change your dressing as told by your health care provider.  ¨ Leave stitches (sutures), skin glue, or adhesive strips in place. These skin closures may need to be in place for 2 weeks or longer. If adhesive strip edges start to loosen and curl up, you may trim the loose edges. Do not remove adhesive strips completely unless your health care provider tells you to do that.  · Keep your incision clean and dry. Do not take baths, swim, or use a hot tub until your health care provider approves.  · Check your incision and the surrounding area every day for redness, swelling, and leaking fluid.  Physical Activity   · Return to your normal activities as told by your health care provider. Ask your health care provider what activities are safe for you. Rest and protect your back as much as possible.  · Follow instructions from your health care provider about how to move and use good posture to help your spine heal.  · Do not lift anything that is heavier than 8 lb (3.6 kg) or the limit that your health care provider tells you until he or she says that it is safe. Avoid lifting anything over your head.  · Do not twist or bend at the waist until your health care provider approves.  · Avoid pushing and pulling motions.  · Avoid sitting or lying down in the same  position for long periods of time.  · Do not begin exercising until told by your health care provider. Ask your health care provider what kinds of exercise you can do to make your back stronger.  General Instructions   · If you were given a brace, use it as told by your health care provider.  · Wear compression stockings as told by your health care provider. These stockings help to prevent blood clots and reduce swelling in your legs.  · Do not use tobacco products, including cigarettes, chewing tobacco, or e-cigarettes. If you need help quitting, ask your health care provider.  · Keep all follow-up visits as told by your health care provider and, if necessary, your physical therapist. This is important.  SEEK MEDICAL CARE IF:  · You have pain that gets worse or does not get better with medicine.  · Your legs or feet become painful or swollen.  · You have redness, swelling, or pain at the site of your incision.  · You have fluid, blood, or pus coming from your incision.  · You vomit or feel nauseous.  · You have weakness or numbness in your legs that is new or getting worse.  · You have a fever.  · You have trouble controlling urination or bowel movements.  SEEK IMMEDIATE MEDICAL CARE IF:   · You have severe pain.  · You have chest pain.  · You have trouble breathing.  · You develop a cough.  These symptoms may represent a serious problem that is an emergency. Do not wait to see if the symptoms will go away. Get medical help right away. Call your local emergency services (911 in the [place] Do not drive yourself to the hospital.   This information is not intended to replace advice given to you by your health care provider. Make sure you discuss any questions you have with your health care provider.  Document Released: 07/07/2006 Document Revised: 04/10/2017 Document Reviewed: 06/01/2016  Elsevier Interactive Patient Education © 2017 Elsevier Inc.      Depression / Suicide Risk    As you are discharged from this  [facility] facility, it is important to learn how to keep safe from harming yourself.    Recognize the warning signs:  · Abrupt changes in personality, positive or negative- including increase in energy   · Giving away possessions  · Change in eating patterns- significant weight changes-  positive or negative  · Change in sleeping patterns- unable to sleep or sleeping all the time   · Unwillingness or inability to communicate  · Depression  · Unusual sadness, discouragement and loneliness  · Talk of wanting to die  · Neglect of personal appearance   · Rebelliousness- reckless behavior  · Withdrawal from people/activities they love  · Confusion- inability to concentrate     If you or a loved one observes any of these behaviors or has concerns about self-harm, here's what you can do:  · Talk about it- your feelings and reasons for harming yourself  · Remove any means that you might use to hurt yourself (examples: pills, rope, extension cords, firearm)  · Get professional help from the community (Mental Health, Substance Abuse, psychological counseling)  · Do not be alone:Call your Safe Contact- someone whom you trust who will be there for you.  · Call your local CRISIS HOTLINE 789-6657 or 469-327-0219  · Call your local Children's Mobile Crisis Response Team [place] (839) 070-8743 or www.[employer]  · Call the toll free National Suicide Prevention Hotlines   · National Suicide Prevention Lifeline 348-634-PIOJ (6456)  · National Hope Line Network 800-SUICIDE (930-8019)

## 2018-03-30 NOTE — PROGRESS NOTES
Patient discharged. PIV removed. Cuevas removed. Patient reports bowel movement and urinating prior to discharge. Cleared by physical therapy. Ambulating in room independently with FWW. Discharge paperwork discussed. All questions answered. Counseled to make hospital follow up appointment within 2 weeks

## 2018-03-30 NOTE — CARE PLAN
Problem: Safety  Goal: Will remain free from injury  Outcome: PROGRESSING AS EXPECTED  Oxygen on at 2 LPM NC , sat @ 94 %.Alarms on bed and chair . Calls appropriately. Call light and tray table in reach.

## 2018-03-30 NOTE — PROGRESS NOTES
Received bedside report from night shift Rn. Pain 6/10 and did not want the PCA pump stated was not working and rather have PRN pain medication.

## 2018-03-30 NOTE — PROGRESS NOTES
Assumed care at 1845. Pt resting in bed. A&ox 4  Ambulated in hallways with brace on.  hemovac drain in place. Posterior back dressing CDI  Tolerating diet. Pain controlled  O2 @ 2LNC. Cuevas still in place  Last BM yesterday. Refusing stool softener  call light within reach. Hourly rounding in place

## 2018-03-30 NOTE — PROGRESS NOTES
hemovac drain with <30cc output for the shift. Drain removed this AM. Pt tolerated procedure well. Dressing applied.

## 2018-03-30 NOTE — CARE PLAN
Problem: Safety  Goal: Will remain free from injury  Updated about POC. Reinforce call light use. Pt acknowledged understanding    Problem: Infection  Goal: Will remain free from infection  Remains afebrile today.     Problem: Pain Management  Goal: Pain level will decrease to patient's comfort goal  Pain controlled with percocet 1 tab q4

## 2018-03-30 NOTE — CARE PLAN
Problem: Pain Management  Goal: Pain level will decrease to patient's comfort goal  Patient states PRN pain management is better than PCA pump. Ambulating helps with pain as well.

## 2021-01-15 DIAGNOSIS — Z23 NEED FOR VACCINATION: ICD-10-CM

## 2023-10-25 ENCOUNTER — TELEPHONE (OUTPATIENT)
Dept: HEALTH INFORMATION MANAGEMENT | Facility: OTHER | Age: 78
End: 2023-10-25
Payer: MEDICARE

## (undated) DEVICE — SEALER BIPOLAR 2.3 AQUAMANTYS

## (undated) DEVICE — CANISTER SUCTION 3000ML MECHANICAL FILTER AUTO SHUTOFF MEDI-VAC NONSTERILE LF DISP  (40EA/CA)

## (undated) DEVICE — INTRAOP NEURO IN OR 1:1 PER 15 MIN

## (undated) DEVICE — SET LEADWIRE 5 LEAD BEDSIDE DISPOSABLE ECG (1SET OF 5/EA)

## (undated) DEVICE — SYRINGE SAFETY 10 ML 18 GA X 1 1/2 BLUNT LL (100/BX 4BX/CA)

## (undated) DEVICE — ARMREST CRADLE FOAM - (2PR/PK 12PR/CA)

## (undated) DEVICE — KIT EVACUATER 3 SPRING PVC LF 1/8 DRAIN SIZE (10EA/CA)"

## (undated) DEVICE — SLEEVE, VASO, THIGH, MED

## (undated) DEVICE — TOOL DISSECT MATCH HEAD

## (undated) DEVICE — DRAPE STRLE REG TOWEL 18X24 - (10/BX 4BX/CA)"

## (undated) DEVICE — WATER IRRIG. STER. 1500 ML - (9/CA)

## (undated) DEVICE — GLOVE BIOGEL SZ 6.5 SURGICAL PF LTX (50PR/BX 4BX/CA)

## (undated) DEVICE — NEEDLE NON-SAFETY HYPO 21 GA X 1 1/2 IN HYPO (100/BX)

## (undated) DEVICE — BLADE SURGICAL CLIPPER - (50EA/CA)

## (undated) DEVICE — KIT SURGIFLO W/OUT THROMBIN - (6EA/CA)

## (undated) DEVICE — MIDAS LUBRICATOR DIFFUSER PACK (4EA/CA)

## (undated) DEVICE — GLOVE BIOGEL PI INDICATOR SZ 7.0 SURGICAL PF LF - (50/BX 4BX/CA)

## (undated) DEVICE — ELECTRODE DUAL RETURN W/ CORD - (50/PK)

## (undated) DEVICE — GLOVE BIOGEL PI ORTHO SZ 6 1/2 SURGICAL PF LF (40PR/BX)

## (undated) DEVICE — GLOVE BIOGEL INDICATOR SZ 7SURGICAL PF LTX - (50/BX 4BX/CA)

## (undated) DEVICE — TUBE E-T HI-LO CUFF 8.0MM (10EA/PK)

## (undated) DEVICE — SUTURE 3-0 VICRYL PLUS RB-1 - 8 X 18 INCH (12/BX)

## (undated) DEVICE — SPONGE GAUZESTER 4 X 4 4PLY - (128PK/CA)

## (undated) DEVICE — PACK JACKSON TABLE KIT W/OUT - HR (6EA/CA)

## (undated) DEVICE — DRAPE LAPAROTOMY T SHEET - (12EA/CA)

## (undated) DEVICE — NEEDLE NON SAFETY HYPO 22 GA X 1 1/2 IN (100/BX)

## (undated) DEVICE — TUBING C&T SET FLYING LEADS DRAIN TUBING (10EA/BX)

## (undated) DEVICE — CLOSURE WOUND 1/4 X 4 (STERI - STRIP) (50/BX 4BX/CA)

## (undated) DEVICE — GOWN WARMING STANDARD FLEX - (30/CA)

## (undated) DEVICE — STERI STRIP COMPOUND BENZOIN - TINCTURE 0.6ML WITH APPLICATOR (40EA/BX)

## (undated) DEVICE — GLOVE BIOGEL PI INDICATOR SZ 8.5 SURGICAL PF LF - (50PR/BX 4BX/CA)

## (undated) DEVICE — DRAPE LARGE 3 QUARTER - (20/CA)

## (undated) DEVICE — APPLICATOR COTTON TIP 6 IN - STERILE (2EA/PK 100PK/BX)

## (undated) DEVICE — TUBING CLEARLINK DUO-VENT - C-FLO (48EA/CA)

## (undated) DEVICE — GLOVE SZ 6.5 BIOGEL PI MICRO - PF LF (50PR/BX)

## (undated) DEVICE — SUTURE GENERAL

## (undated) DEVICE — LACTATED RINGERS INJ. 500 ML - (24EA/CA)

## (undated) DEVICE — DEVICE MONOPOLAR RF PEAK PLASMABLADE 3.0S

## (undated) DEVICE — CHLORAPREP 26 ML APPLICATOR - ORANGE TINT(25/CA)

## (undated) DEVICE — HEADREST PRONEVIEW LARGE - (10/CA)

## (undated) DEVICE — NEPTUNE 4 PORT MANIFOLD - (20/PK)

## (undated) DEVICE — SODIUM CHL IRRIGATION 0.9% 1000ML (12EA/CA)

## (undated) DEVICE — KIT ROOM DECONTAMINATION

## (undated) DEVICE — PEDIGUARD

## (undated) DEVICE — PACK NEURO - (2EA/CA)

## (undated) DEVICE — PROTECTOR ULNA NERVE - (36PR/CA)

## (undated) DEVICE — SYRINGE SAFETY 3 ML 18 GA X 1 1/2 BLUNT LL (100/BX 8BX/CA)

## (undated) DEVICE — SUTURE 1 VICRYL PLUS CTX - 8 X 18 INCH (12/BX)

## (undated) DEVICE — KIT ANESTHESIA W/CIRCUIT & 3/LT BAG W/FILTER (20EA/CA)

## (undated) DEVICE — LACTATED RINGERS INJ 1000 ML - (14EA/CA 60CA/PF)

## (undated) DEVICE — ELECTRODE 850 FOAM ADHESIVE - HYDROGEL RADIOTRNSPRNT (50/PK)

## (undated) DEVICE — TRAY CATHETER FOLEY URINE METER W/STATLOCK 350ML (10EA/CA)

## (undated) DEVICE — BLADE SAW SMALL BONE AGGRESSIVE 31.0 X 9 X 0.38MM

## (undated) DEVICE — SENSOR SPO2 NEO LNCS ADHESIVE (20/BX) SEE USER NOTES

## (undated) DEVICE — SUCTION INSTRUMENT YANKAUER BULBOUS TIP W/O VENT (50EA/CA)

## (undated) DEVICE — BONE MILL BM210

## (undated) DEVICE — SET EXTENSION WITH 2 PORTS (48EA/CA) ***PART #2C8610 IS A SUBSTITUTE*****

## (undated) DEVICE — GOWN SURGEONS X-LARGE - DISP. (30/CA)

## (undated) DEVICE — GLOVE BIOGEL SZ 8.5 SURGICAL PF LTX - (50PR/BX 4BX/CA)

## (undated) DEVICE — MASK ANESTHESIA ADULT  - (100/CA)